# Patient Record
Sex: FEMALE | Race: WHITE | NOT HISPANIC OR LATINO | URBAN - METROPOLITAN AREA
[De-identification: names, ages, dates, MRNs, and addresses within clinical notes are randomized per-mention and may not be internally consistent; named-entity substitution may affect disease eponyms.]

---

## 2017-05-22 ENCOUNTER — TRANSFERRED RECORDS (OUTPATIENT)
Dept: HEALTH INFORMATION MANAGEMENT | Facility: CLINIC | Age: 67
End: 2017-05-22

## 2017-10-10 ENCOUNTER — TRANSFERRED RECORDS (OUTPATIENT)
Dept: HEALTH INFORMATION MANAGEMENT | Facility: CLINIC | Age: 67
End: 2017-10-10

## 2017-10-13 ENCOUNTER — MEDICAL CORRESPONDENCE (OUTPATIENT)
Dept: HEALTH INFORMATION MANAGEMENT | Facility: CLINIC | Age: 67
End: 2017-10-13

## 2017-10-31 ENCOUNTER — TELEPHONE (OUTPATIENT)
Dept: ORTHOPEDICS | Facility: CLINIC | Age: 67
End: 2017-10-31

## 2017-10-31 NOTE — TELEPHONE ENCOUNTER
Patients caregiver called back and stated that she spoke with Stefani and she would rather see  who would be able to help her more effectively than .  suggest that  would be a better fit for Stefani to have a positive outcome. I confirmed with the caregiver that Stefani is scheduled with  on 11/8/17 at 6 PM. This appointment was okayed by Dedra. All questions were answered.

## 2017-10-31 NOTE — TELEPHONE ENCOUNTER
Called patient and nursing home picked up. The reason for the call is  suggested that this patient may not be the best fit for her to see him. He did say that he would be happy to see her if she was already on her way. The person at nursing home I spoke with said that Stefani was already on her way to her appointment and she doesn't have a phone available to contact.

## 2017-10-31 NOTE — TELEPHONE ENCOUNTER
Lianne (patients caregiver) spoke with Stefani and confirmed that the appointment with  on 11/8/17 would not work, due to her anxiety of driving at night and possibly in the snow. Stefani would rather have an appointment during the day. She was rescheduled in January.

## 2018-01-09 NOTE — TELEPHONE ENCOUNTER
Records Received From: [facility]    Date/Exam/Location  (specify location if different)   Office Notes: 9/16/17    Radiology Reports: [facility] - ph: [phone]    XR Rt Unilateral Hip/Pelvis 10/24/17  XR Rt Knee 5/22/17   Missing: Need imaging

## 2018-01-09 NOTE — TELEPHONE ENCOUNTER
APPT INFO    Date /Time: 1/24/18 1PM   Reason for Appt: Rt Knee DJD   Ref Provider/Clinic: Dr. Toth -- [facility]   Are there internal records? Yes/No?  IF YES, list clinic names: no   Are there outside records? Yes/No? Yes - see below   Patient Contact (Y/N) & Call Details: Contacted phone# in pt's chart -- it is pt's assisted living home ([facility]). Spoke to nurse Lianne, she will fax pt's records from [facility]   Action: --     OUTSIDE RECORDS CHECKLIST     CLINIC NAME COMMENTS REC (x) IMG (x)   [facility]  X X

## 2018-01-09 NOTE — TELEPHONE ENCOUNTER
ACTION    What did you do? Faxed request to [facility] to see if pt has additional records for knee.     [place] authorization form not signed - unable to access records in [place].

## 2018-01-10 NOTE — TELEPHONE ENCOUNTER
Records Received From: [facility]    Date/Exam/Location  (specify location if different)   Office Notes: 8/7/15, 8/8/15, 8/26/15, 9/4/15, 12/16/15, 2/8/16, 10/18/16, 11/18/16, 6/6/17, 7/18/17, 7/21/17, 10/10/17   ED/Hosp Notes: 8/7/15, 9/4/15 - [facility]   Radiology Reports: [facility] [place] [phone]    XR R Knee 7/27/15 & 5/22/17   CT R Knee/ Lower Ext 7/28/15   Operative Notes & Implant Records: Rt Knee Irrigation & Debridement 8/9/15 -- Dr. Rajeev Gonzalez    Rt Knee Irrigation and Excisional Debridement & L Knee Aspiration 8/10/15 -- Dr. Rajeev Gonzalez       Missing: Need imaging

## 2018-01-10 NOTE — TELEPHONE ENCOUNTER
ACTION    What did you do? Faxed request to [facility] for imaging

## 2018-01-16 NOTE — TELEPHONE ENCOUNTER
Received Imaging From: [facility]    Image Type (x): Disc:_x__  Pacs:___      Exam Date/Name: XR R Knee 7/27/15, 5/22/17    XR Rt Knee 7/28/15    XR R Hip/Pelvis 10/24/17 Comments: [facility]

## 2018-01-24 ENCOUNTER — TELEPHONE (OUTPATIENT)
Dept: ORTHOPEDICS | Facility: CLINIC | Age: 68
End: 2018-01-24

## 2018-01-24 ENCOUNTER — PRE VISIT (OUTPATIENT)
Dept: ORTHOPEDICS | Facility: CLINIC | Age: 68
End: 2018-01-24

## 2018-01-24 ENCOUNTER — OFFICE VISIT (OUTPATIENT)
Dept: ORTHOPEDICS | Facility: CLINIC | Age: 68
End: 2018-01-24
Payer: MEDICAID

## 2018-01-24 ENCOUNTER — RADIANT APPOINTMENT (OUTPATIENT)
Dept: GENERAL RADIOLOGY | Facility: CLINIC | Age: 68
End: 2018-01-24
Attending: ORTHOPAEDIC SURGERY
Payer: MEDICAID

## 2018-01-24 DIAGNOSIS — M17.11 RIGHT KNEE DJD: ICD-10-CM

## 2018-01-24 DIAGNOSIS — M17.11 RIGHT KNEE DJD: Primary | ICD-10-CM

## 2018-01-24 DIAGNOSIS — M17.11 PRIMARY OSTEOARTHRITIS OF RIGHT KNEE: Primary | ICD-10-CM

## 2018-01-24 RX ORDER — MUSCLE RUB CREAM 100; 150 MG/G; MG/G
CREAM TOPICAL EVERY 6 HOURS PRN
COMMUNITY

## 2018-01-24 RX ORDER — BISACODYL 10 MG
10 SUPPOSITORY, RECTAL RECTAL DAILY PRN
COMMUNITY

## 2018-01-24 RX ORDER — POTASSIUM CHLORIDE 1.5 G/1.58G
10 POWDER, FOR SOLUTION ORAL 2 TIMES DAILY
COMMUNITY

## 2018-01-24 RX ORDER — POLYETHYLENE GLYCOL 3350 17 G/17G
1 POWDER, FOR SOLUTION ORAL DAILY
COMMUNITY

## 2018-01-24 RX ORDER — ALBUTEROL SULFATE 0.83 MG/ML
1 SOLUTION RESPIRATORY (INHALATION) EVERY 6 HOURS PRN
COMMUNITY

## 2018-01-24 RX ORDER — ALBUTEROL SULFATE 5 MG/ML
2.5 SOLUTION RESPIRATORY (INHALATION) EVERY 6 HOURS PRN
COMMUNITY

## 2018-01-24 RX ORDER — OXYBUTYNIN CHLORIDE 5 MG/1
5 TABLET, EXTENDED RELEASE ORAL DAILY
COMMUNITY

## 2018-01-24 RX ORDER — CALCIUM CARBONATE 500 MG/1
1 TABLET, CHEWABLE ORAL DAILY
COMMUNITY

## 2018-01-24 RX ORDER — MAGNESIUM HYDROXIDE/ALUMINUM HYDROXICE/SIMETHICONE 120; 1200; 1200 MG/30ML; MG/30ML; MG/30ML
30 SUSPENSION ORAL EVERY 4 HOURS PRN
COMMUNITY

## 2018-01-24 RX ORDER — CALCIUM POLYCARBOPHIL 625 MG 625 MG/1
2 TABLET ORAL DAILY
COMMUNITY

## 2018-01-24 NOTE — TELEPHONE ENCOUNTER
Pt resides at [facility] in [facility].  Stefani has been referred to Dr Martinez.  Message from clinic director that pt's insurance, [facility] will not cover surgery in [place].  Today's appt with Dr Martinez is fine, but pt will need to be referred to a [place] provider if surgery is recommended.

## 2018-01-24 NOTE — MR AVS SNAPSHOT
After Visit Summary   1/24/2018    Stefani Chester    MRN: [id number]           Patient Information     Date Of Birth          1950        Visit Information        Provider Department      1/24/2018 1:00 PM Madhu Martinez MD [facility]        Care Instructions    Stefani is a very pleasant 66-year-old woman who has had a great deal of difficulty following sepsis that occurred back in 2015. Notably she had a right knee infection and subsequent irrigation and debridement. She has basically been wheelchair bound more or less since the episode of sepsis. She had subluxation of her knee on prior x-rays. The imaging we obtained today shows stable alignment with slightly better appearing subluxation.    She also has end-stage arthritis and we discussed total knee replacement. However given her extensive lymphedema and comorbidities I would be concerned that prosthetic joint infection would occur, which would leave her worse off even then she has now. At this point I recommended she continue to work with physical therapy for strengthening and stretching. She should have pain be her guide in terms of standing and weightbearing activities. Otherwise she should focus on in bed or in wheelchair exercises including strengthening and stretching her surrounding muscles. Please contact our office if you have any questions or concerns.          Follow-ups after your visit        Who to contact     Please call your clinic at [phone] to:    Ask questions about your health    Make or cancel appointments    Discuss your medicines    Learn about your test results    Speak to your doctor   If you have compliments or concerns about an experience at your clinic, or if you wish to file a complaint, please contact [facility] Patient Relations at [phone] or email us at [email or web address]         Additional Information About Your Visit        [place]  Information     On The Bill is an electronic gateway that provides easy, online access to your medical records. With On The Bill, you can request a clinic appointment, read your test results, renew a prescription or communicate with your care team.     To sign up for On The Bill visit the website at [email or web address]   You will be asked to enter the access code listed below, as well as some personal information. Please follow the directions to create your username and password.     Your access code is: [email or web address]  Expires: 2018  1:52 PM     Your access code will  in 90 days. If you need help or a new code, please contact your [facility] or call [phone] for assistance.        Care EveryWhere ID     This is your Care EveryWhere ID. This could be used by other organizations to access your [place] medical records  [id number]         Blood Pressure from Last 3 Encounters:   No data found for BP    Weight from Last 3 Encounters:   No data found for Wt              Today, you had the following     No orders found for display       Primary Care Provider    None Specified       No primary provider on file.        Equal Access to Services     [facility]: Hadii adriel Charles, wakayden poole, qayoko talamantes, rufus merchant . So [facility] [phone].    ATENCIÓN: Si habla español, tiene a szymanski disposición servicios gratuitos de asistencia lingüística. Llame al [phone].    We comply with applicable federal civil rights laws and [place] laws. We do not discriminate on the basis of race, color, national origin, age, disability, sex, sexual orientation, or gender identity.            Thank you!     Thank you for choosing [facility]  for your care. Our goal is always to provide you with excellent care. Hearing back from our patients is one way we can continue to improve our services. Please take a few minutes to  complete the written survey that you may receive in the mail after your visit with us. Thank you!             Your Updated Medication List - Protect others around you: Learn how to safely use, store and throw away your medicines at www.disposemymeds.org.          This list is accurate as of 1/24/18  1:53 PM.  Always use your most recent med list.                   Brand Name Dispense Instructions for use Diagnosis    * albuterol (2.5 MG/3ML) 0.083% neb solution      Take 1 vial by nebulization every 6 hours as needed for shortness of breath / dyspnea or wheezing        * albuterol (5 MG/ML) 0.5% neb solution    PROVENTIL     Take 2.5 mg by nebulization every 6 hours as needed for wheezing or shortness of breath / dyspnea        artificial tears Oint ophthalmic ointment      At Bedtime        ATORVASTATIN CALCIUM PO      Take 10 mg by mouth daily        BACLOFEN PO      Take 5 mg by mouth 2 times daily        BENADRYL PO      Take 25 mg by mouth every 8 hours as needed        bisacodyl 10 MG Suppository    DULCOLAX     Place 10 mg rectally daily as needed for constipation        BUPROPION HCL PO      Take 100 mg by mouth 2 times daily        calcium carbonate 500 MG chewable tablet    TUMS     Take 1 chew tab by mouth daily        calcium polycarbophil 625 MG tablet    FIBERCON     Take 2 tablets by mouth daily        COLACE PO      Take 100 mg by mouth 2 times daily        fluticasone-salmeterol 250-50 MCG/DOSE diskus inhaler    ADVAIR     Inhale 1 puff into the lungs every 12 hours        GABAPENTIN PO      Take 300 mg by mouth 3 times daily        * JAY-LANTA 200-200-20 MG/5ML Susp suspension   Generic drug:  alum & mag hydroxide-simethicone      Take 30 mLs by mouth every 4 hours as needed for indigestion        * alum & mag hydroxide-simethicone 200-200-20 MG/5ML Susp suspension    MYLANTA/MAALOX     Take 30 mLs by mouth every 4 hours as needed for indigestion        guaiFENesin 20 mg/mL Soln solution     ROBITUSSIN     Take 10 mLs by mouth every 4 hours as needed for cough        IBUPROFEN PO      Take 400 mg by mouth        LASIX PO      Take 20 mg by mouth 2 times daily        LEXAPRO PO      Take 20 mg by mouth daily        LISINOPRIL PO      Take 10 mg by mouth daily        * LORAZEPAM PO      Take 0.5 mg by mouth 2 times daily        * ATIVAN PO      Take 0.5 mg by mouth every 6 hours as needed for anxiety        magnesium hydroxide 400 MG/5ML suspension    MILK OF MAGNESIA     Take by mouth daily as needed for constipation or heartburn        METHADONE HCL PO      Take 10 mg by mouth every 12 hours        muscle rub 10-15 % Crea      Apply topically every 6 hours as needed for moderate pain        OMEPRAZOLE PO      Take 20 mg by mouth every morning        ONDANSETRON PO      Take 4 mg by mouth every 8 hours as needed for nausea        oxybutynin 5 MG 24 hr tablet    DITROPAN-XL     Take 5 mg by mouth daily        OXYCODONE HCL PO      Take 5 mg by mouth daily        polyethylene glycol powder    MIRALAX/GLYCOLAX     Take 1 capful by mouth daily        potassium chloride 20 MEQ Packet    KLOR-CON     Take 10 mEq by mouth 2 times daily        PREMARIN PO      Take 0.625 mg by mouth daily        SINGULAIR PO      Take 10 mg by mouth At Bedtime        SODIUM CHLORIDE (INHALANT) IN           sodium phosphate 7-19 GM/118ML rectal enema      Place 1 enema rectally once        * Notice:  This list has 6 medication(s) that are the same as other medications prescribed for you. Read the directions carefully, and ask your doctor or other care provider to review them with you.

## 2018-01-24 NOTE — LETTER
1/24/2018       RE: Stefani Chester  [address]  [place] [address]     Dear Colleague,    Thank you for referring your patient, Stefani Chester, to the [facility] at [facility]. Please see a copy of my visit note below.    [facility], Orthopaedic Surgery, Arthritis, Hip and Knee Replacement    Stefani Chester MRN# [id number]   Age: 67 year old YOB: 1950     Requesting physician: John Laureano              History of Present Illness:   Stefani Chester is a 67 year old year old female who presents today for evaluation and management of right knee pain and subluxation. Stefani notes that more or less since 2015 she has not been able to weight-bear due to an instance of sepsis that involved her right knee and right hand. She had surgery for that. She is then in a care facility more or less since that time. She has tried to do weightbearing activities, however has been limited by severe pain. Imaging was obtained which showed subluxation of her knee and arthritis. She is also dealing with severe lymphedema. She notes difficulty treating this due to the issues with the Lasix and her renal function.           Past Medical History:   There is no problem list on file for this patient.    No past medical history on file.  She has a history of sepsis and had a right knee irrigation and debridement. She has been very debilitated ever since this.             Past Surgical History:   No past surgical history on file.         Social History:     Social History     Social History     Marital status: Single     Spouse name: N/A     Number of children: N/A     Years of education: N/A     Occupational History     Not on file.     Social History Main Topics     Smoking status: Not on file     Smokeless tobacco: Not on file     Alcohol use Not on file     Drug use: Not on file     Sexual activity: Not on file     Other Topics Concern     Not on file      Social History Narrative              Family History:     No family history on file.           Medications:     Current Outpatient Prescriptions   Medication Sig     ATORVASTATIN CALCIUM PO Take 10 mg by mouth daily     BACLOFEN PO Take 5 mg by mouth 2 times daily     BUPROPION HCL PO Take 100 mg by mouth 2 times daily     Docusate Sodium (COLACE PO) Take 100 mg by mouth 2 times daily     calcium polycarbophil (FIBERCON) 625 MG tablet Take 2 tablets by mouth daily     GABAPENTIN PO Take 300 mg by mouth 3 times daily     Escitalopram Oxalate (LEXAPRO PO) Take 20 mg by mouth daily     LISINOPRIL PO Take 10 mg by mouth daily     LORAZEPAM PO Take 0.5 mg by mouth 2 times daily     METHADONE HCL PO Take 10 mg by mouth every 12 hours     polyethylene glycol (MIRALAX/GLYCOLAX) powder Take 1 capful by mouth daily     OMEPRAZOLE PO Take 20 mg by mouth every morning     oxybutynin (DITROPAN-XL) 5 MG 24 hr tablet Take 5 mg by mouth daily     OXYCODONE HCL PO Take 5 mg by mouth daily     potassium chloride (KLOR-CON) 20 MEQ Packet Take 10 mEq by mouth 2 times daily     Estrogens Conjugated (PREMARIN PO) Take 0.625 mg by mouth daily     SODIUM CHLORIDE, INHALANT, IN      Montelukast Sodium (SINGULAIR PO) Take 10 mg by mouth At Bedtime     calcium carbonate (TUMS) 500 MG chewable tablet Take 1 chew tab by mouth daily     albuterol (2.5 MG/3ML) 0.083% neb solution Take 1 vial by nebulization every 6 hours as needed for shortness of breath / dyspnea or wheezing     fluticasone-salmeterol (ADVAIR) 250-50 MCG/DOSE diskus inhaler Inhale 1 puff into the lungs every 12 hours     Furosemide (LASIX PO) Take 20 mg by mouth 2 times daily     alum & mag hydroxide-simethicone (JAY-LANTA) 200-200-20 MG/5ML SUSP suspension Take 30 mLs by mouth every 4 hours as needed for indigestion     artificial tears OINT ophthalmic ointment At Bedtime     LORazepam (ATIVAN PO) Take 0.5 mg by mouth every 6 hours as needed for anxiety     DiphenhydrAMINE  HCl (BENADRYL PO) Take 25 mg by mouth every 8 hours as needed     bisacodyl (DULCOLAX) 10 MG Suppository Place 10 mg rectally daily as needed for constipation     sodium phosphate (FLEET ENEMA) 7-19 GM/118ML rectal enema Place 1 enema rectally once     guaiFENesin (ROBITUSSIN) 20 mg/mL SOLN solution Take 10 mLs by mouth every 4 hours as needed for cough     IBUPROFEN PO Take 400 mg by mouth     magnesium hydroxide (MILK OF MAGNESIA) 400 MG/5ML suspension Take by mouth daily as needed for constipation or heartburn     alum & mag hydroxide-simethicone (MYLANTA/MAALOX) 200-200-20 MG/5ML SUSP suspension Take 30 mLs by mouth every 4 hours as needed for indigestion     muscle rub (ARTHRITIS HOT) 10-15 % CREA Apply topically every 6 hours as needed for moderate pain     albuterol (PROVENTIL) (5 MG/ML) 0.5% neb solution Take 2.5 mg by nebulization every 6 hours as needed for wheezing or shortness of breath / dyspnea     ONDANSETRON PO Take 4 mg by mouth every 8 hours as needed for nausea     No current facility-administered medications for this visit.               Physical Exam:     EXAMINATION pertinent findings:   VITAL SIGNS: There were no vitals taken for this visit.  There is no height or weight on file to calculate BMI.  GEN: AOx3, cooperative, no distress  RESP: non labored breathing   ABD: benign   SKIN: grossly normal   LYMPHATIC: grossly normal   NEURO: grossly normal   VASCULAR: satisfactory perfusion of all extremities  MUSCULOSKELETAL:   She has extensive bilateral lower extremity lymphedema. It is tense and pitting. She has severe lymphedema of her proximal posterior thigh. Her prior right knee incision is well-healed. She has bilateral knee range of motion from about 0-90  with no extensor lag. She has no gross laxity of her bilateral knees. Ankle plantarflexion and dorsiflexion is intact.             Data:   Imaging:   Plain x-rays are reviewed which demonstrate mild subluxation of the tibiofemoral joint.  However this is improved when compared to prior films. She also has end-stage knee arthritis.           Assessment and Plan:   Assessment:  Stefani is a very pleasant 67 old woman with a history of sepsis and now right knee arthritis. We discussed ongoing management options. Ultimately total knee replacement would be a reasonable treatment option. However, given the severity of her lymphedema and comorbidities I think a knee replacement would be a considerable risk and a very high risk of prosthetic joint infection. I recommended she work with physical therapy for strengthening and stretching. She should let pain be her guide. If she is able to do weightbearing activities she should progress as tolerated with this. Otherwise she should focus on in bed-type activities. She will return to clinic on a p.r.n. basis if her symptoms are worsening and her medical comorbidities are improving.      Again, thank you for allowing me to participate in the care of your patient.      Sincerely,    Madhu Martinez MD

## 2018-01-24 NOTE — PATIENT INSTRUCTIONS
Stefani is a very pleasant 66-year-old woman who has had a great deal of difficulty following sepsis that occurred back in 2015. Notably she had a right knee infection and subsequent irrigation and debridement. She has basically been wheelchair bound more or less since the episode of sepsis. She had subluxation of her knee on prior x-rays. The imaging we obtained today shows stable alignment with slightly better appearing subluxation.    She also has end-stage arthritis and we discussed total knee replacement. However given her extensive lymphedema and comorbidities I would be concerned that prosthetic joint infection would occur, which would leave her worse off even then she has now. At this point I recommended she continue to work with physical therapy for strengthening and stretching. She should have pain be her guide in terms of standing and weightbearing activities. Otherwise she should focus on in bed or in wheelchair exercises including strengthening and stretching her surrounding muscles. Please contact our office if you have any questions or concerns.

## 2018-01-30 ENCOUNTER — TELEPHONE (OUTPATIENT)
Dept: ORTHOPEDICS | Facility: CLINIC | Age: 68
End: 2018-01-30

## 2018-01-30 NOTE — TELEPHONE ENCOUNTER
Received a call from Manju -Physical therapist from [facility] where patient resides. She states they are concerned about attempting weight bearing with the patient because she sustained a fracture in the past when weight bearing. She voiced concern about the subluxation of her knee. Dr. Martinez's note from 1/24/2018 states the imaging done on 1/24/2018 shows stable alignment with slightly better appearing subluxation  He recommended she continue to work with physical therapy for strengthening and stretching. She should have pain be her guide in terms of standing and weightbearing activities. Otherwise she should focus on in bed or in wheelchair exercises including strengthening and stretching her surrounding muscles.   Manju states that the patient is involved in a program to help with strengthening, but does not like to participate.   Recommended they continue to encourage the current plan as outlined by Dr. Martinez and document participation.   They have our number if there are further concerns.

## 2018-07-20 ENCOUNTER — RECORDS - HEALTHEAST (OUTPATIENT)
Dept: LAB | Facility: CLINIC | Age: 68
End: 2018-07-20

## 2018-07-22 LAB — BACTERIA SPEC CULT: ABNORMAL

## 2019-04-01 ENCOUNTER — RECORDS - HEALTHEAST (OUTPATIENT)
Dept: LAB | Facility: CLINIC | Age: 69
End: 2019-04-01

## 2019-04-01 LAB — GGT SERPL-CCNC: 85 U/L (ref 0–50)
